# Patient Record
Sex: MALE | Race: WHITE | NOT HISPANIC OR LATINO | Employment: OTHER | URBAN - NONMETROPOLITAN AREA
[De-identification: names, ages, dates, MRNs, and addresses within clinical notes are randomized per-mention and may not be internally consistent; named-entity substitution may affect disease eponyms.]

---

## 2023-03-30 ENCOUNTER — OFFICE VISIT (OUTPATIENT)
Dept: URGENT CARE | Facility: CLINIC | Age: 38
End: 2023-03-30

## 2023-03-30 VITALS
HEIGHT: 67 IN | SYSTOLIC BLOOD PRESSURE: 148 MMHG | DIASTOLIC BLOOD PRESSURE: 93 MMHG | RESPIRATION RATE: 18 BRPM | HEART RATE: 88 BPM | OXYGEN SATURATION: 98 % | TEMPERATURE: 98.7 F | BODY MASS INDEX: 27.47 KG/M2 | WEIGHT: 175 LBS

## 2023-03-30 DIAGNOSIS — H65.192 ACUTE EFFUSION OF LEFT EAR: ICD-10-CM

## 2023-03-30 DIAGNOSIS — J02.9 SORE THROAT: Primary | ICD-10-CM

## 2023-03-30 LAB — S PYO AG THROAT QL: POSITIVE

## 2023-03-30 RX ORDER — AMOXICILLIN 500 MG/1
500 CAPSULE ORAL EVERY 12 HOURS SCHEDULED
Qty: 20 CAPSULE | Refills: 0 | Status: SHIPPED | OUTPATIENT
Start: 2023-03-30 | End: 2023-04-09

## 2023-03-30 NOTE — PROGRESS NOTES
[id number]        NAME: Gaby Good is a 30 y o  male  : 1985    MRN: [id number]  DATE: 2023  TIME: 6:06 PM    Assessment and Plan   Sore throat [J02 9]  1  Sore throat  POCT rapid strepA      2  Acute effusion of left ear          Rapid strep positive  Sending in amoxicillin  Given advice for at home remedies  Advised follow-up with PCP if no improvement  Advised to return or go to the ER symptoms worsen  Patient Instructions     Take prescribed medication as instructed  Tylenol or ibuprofen for pain or fever  Drink plenty of fluids and rest   May try tea with honey, teaspoon of honey, throat lozenges, Chloraseptic spray to help with sore throat symptoms  May take daily antihistamine or daily Flonase  If no improvement, follow-up with PCP  If symptoms worsen, return or go to the ER  Follow up with PCP in 3-5 days  Proceed to  ER if symptoms worsen  Chief Complaint     Chief Complaint   Patient presents with   • Sore Throat   • Earache     Left ear pain symptoms started 3 to 4 days ago  History of Present Illness       29-year-old male presents with 3 to 4 days of left earache and sore throat  Denies sick contacts  PT denies taking over-the-counter medication  Admits to some pain with swallowing  PT has been eating and drinking normally  PT states that he took 2 days of his wife's leftover amoxicillin 2 to 3 days ago  Denies any fever, chills, chest pain, shortness of breath, abdominal pain, nausea, vomiting, diarrhea  Review of Systems   Review of Systems   Constitutional: Negative  HENT: Positive for ear pain and sore throat  Negative for ear discharge  Eyes: Negative  Respiratory: Negative  Cardiovascular: Negative  Gastrointestinal: Negative  Musculoskeletal: Negative  Skin: Negative  Neurological: Negative  Current Medications     No current outpatient medications on file      Current Allergies     Allergies as of "03/30/2023   • (No Known Allergies)            The following portions of the patient's history were reviewed and updated as appropriate: allergies, current medications, past family history, past medical history, past social history, past surgical history and problem list      History reviewed  No pertinent past medical history  History reviewed  No pertinent surgical history  History reviewed  No pertinent family history  Medications have been verified  Objective   /93   Pulse 88   Temp 98 7 °F (37 1 °C)   Resp 18   Ht 5' 7\" (1 702 m)   Wt 79 4 kg (175 lb)   SpO2 98%   BMI 27 41 kg/m²        Physical Exam     Physical Exam  Constitutional:       General: He is not in acute distress  Appearance: Normal appearance  He is not ill-appearing  HENT:      Head: Normocephalic and atraumatic  Right Ear: Tympanic membrane, ear canal and external ear normal       Left Ear: Ear canal and external ear normal  A middle ear effusion is present  Tympanic membrane is not erythematous  Nose: Nose normal       Mouth/Throat:      Lips: Pink  Mouth: Mucous membranes are moist       Pharynx: Oropharynx is clear  Uvula midline  Posterior oropharyngeal erythema present  No pharyngeal swelling, oropharyngeal exudate or uvula swelling  Tonsils: No tonsillar exudate or tonsillar abscesses  2+ on the right  2+ on the left  Eyes:      Extraocular Movements: Extraocular movements intact  Conjunctiva/sclera: Conjunctivae normal       Pupils: Pupils are equal, round, and reactive to light  Cardiovascular:      Rate and Rhythm: Normal rate and regular rhythm  Pulses: Normal pulses  Heart sounds: Normal heart sounds  No murmur heard  No friction rub  No gallop  Pulmonary:      Effort: Pulmonary effort is normal  No respiratory distress  Breath sounds: Normal breath sounds  No stridor  No wheezing, rhonchi or rales  Chest:      Chest wall: No tenderness   " Musculoskeletal:      Cervical back: Normal range of motion and neck supple  No tenderness  Lymphadenopathy:      Cervical: Cervical adenopathy (Submandibular) present  Skin:     General: Skin is warm  Capillary Refill: Capillary refill takes less than 2 seconds  Findings: No rash  Neurological:      General: No focal deficit present  Mental Status: He is alert and oriented to person, place, and time     Psychiatric:         Mood and Affect: Mood normal          Behavior: Behavior normal

## 2023-03-30 NOTE — PATIENT INSTRUCTIONS
Take prescribed medication as instructed  Tylenol or ibuprofen for pain or fever  Drink plenty of fluids and rest   May try tea with honey, teaspoon of honey, throat lozenges, Chloraseptic spray to help with sore throat symptoms  May take daily antihistamine or daily Flonase  If no improvement, follow-up with PCP  If symptoms worsen, return or go to the ER  Follow up with PCP in 3-5 days  Proceed to  ER if symptoms worsen  Strep Throat   WHAT YOU NEED TO KNOW:   Strep throat is a throat infection caused by bacteria  It is easily spread from person to person  DISCHARGE INSTRUCTIONS:   Call 911 for any of the following: You have trouble breathing  Return to the emergency department if:   You have new symptoms like a bad headache, stiff neck, chest pain, or vomiting  You are drooling because you cannot swallow your spit  Contact your healthcare provider if:   You have a fever  You have a rash or ear pain  You have green, yellow-brown, or bloody mucus when you cough or blow your nose  You are unable to drink anything  You have questions or concerns about your condition or care  Medicines:   Antibiotics  help treat your strep throat  You should feel better within 2 to 3 days after you start antibiotics  Take your medicine as directed  Contact your healthcare provider if you think your medicine is not helping or if you have side effects  Tell your provider if you are allergic to any medicine  Keep a list of the medicines, vitamins, and herbs you take  Include the amounts, and when and why you take them  Bring the list or the pill bottles to follow-up visits  Carry your medicine list with you in case of an emergency  Manage your symptoms:   Use lozenges, ice, soft foods, or popsicles  to soothe your throat  Drink juice, milk shakes, or soup  if your throat is too sore to eat solid food  Drinking liquids can also help prevent dehydration  Gargle with salt water    Mix ¼ teaspoon salt in a glass of warm water and gargle  This may help reduce swelling in your throat  Do not smoke  Nicotine and other chemicals in cigarettes and cigars can cause lung damage and make your symptoms worse  Ask your healthcare provider for information if you currently smoke and need help to quit  E-cigarettes or smokeless tobacco still contain nicotine  Talk to your healthcare provider before you use these products  Return to work or school  24 hours after you start antibiotic medicine  Prevent the spread of strep throat:   Wash your hands often  Use soap and water  Wash your hands after you use the bathroom, change a child's diapers, or sneeze  Wash your hands before you prepare or eat food  Do not share food or drinks  Replace your toothbrush after you have taken antibiotics for 24 hours  Follow up with your doctor as directed:  Write down your questions so you remember to ask them during your visits  © Copyright [employer] 2022 Information is for End User's use only and may not be sold, redistributed or otherwise used for commercial purposes  The above information is an  only  It is not intended as medical advice for individual conditions or treatments  Talk to your doctor, nurse or pharmacist before following any medical regimen to see if it is safe and effective for you  Fluid In The Ear (Serous Otitis Media)   WHAT YOU NEED TO KNOW:   PHI is fluid trapped in the middle of your ear behind your eardrum  This condition usually develops without signs or symptoms of an ear infection  Serous otitis media may be caused by an upper respiratory infection or allergies  It is most common in the fall and early spring  DISCHARGE INSTRUCTIONS:   Call your doctor if:   You develop severe ear pain  The outside of your ear is red or swollen  You have fluid coming from your ear  You have questions or concerns about your condition or care      How to stay healthy:   Wash your hands often throughout the day  Use soap and water  Rub your soapy hands together, lacing your fingers, for at least 20 seconds  Rinse with warm, running water  Dry your hands with a clean towel or paper towel  Use hand  that contains alcohol if soap and water are not available  Teach children how to wash their hands and use hand   Avoid people who are sick  Some germs are easily and quickly spread through contact  Follow up with your doctor as directed:  Write down your questions so you remember to ask them during your visits  © Copyright [employer] 2022 Information is for End User's use only and may not be sold, redistributed or otherwise used for commercial purposes  The above information is an  only  It is not intended as medical advice for individual conditions or treatments  Talk to your doctor, nurse or pharmacist before following any medical regimen to see if it is safe and effective for you